# Patient Record
Sex: MALE | Race: OTHER
[De-identification: names, ages, dates, MRNs, and addresses within clinical notes are randomized per-mention and may not be internally consistent; named-entity substitution may affect disease eponyms.]

---

## 2020-06-20 ENCOUNTER — HOSPITAL ENCOUNTER (EMERGENCY)
Dept: HOSPITAL 88 - FSED | Age: 50
Discharge: HOME | End: 2020-06-20
Payer: COMMERCIAL

## 2020-06-20 VITALS — HEIGHT: 65 IN | WEIGHT: 160.38 LBS | BODY MASS INDEX: 26.72 KG/M2

## 2020-06-20 VITALS — DIASTOLIC BLOOD PRESSURE: 91 MMHG | SYSTOLIC BLOOD PRESSURE: 155 MMHG

## 2020-06-20 DIAGNOSIS — M54.42: Primary | ICD-10-CM

## 2020-06-20 PROCEDURE — 99283 EMERGENCY DEPT VISIT LOW MDM: CPT

## 2020-06-20 PROCEDURE — 72100 X-RAY EXAM L-S SPINE 2/3 VWS: CPT

## 2020-06-20 NOTE — DIAGNOSTIC IMAGING REPORT
EXAM: Lumbar spine 3 views

INDICATION:     

Pain

COMPARISON: None



FINDINGS:



There are the usual 5 nonrib bearing lumbar vertebral bodies. Normal lumbar

lordosis. No listhesis. No vertebral body height loss. No scoliosis.



Minimal intervertebral disc height loss at L1-L2 and L2-L3. Mild L4-L5 and

L5-S1 facet arthrosis.



Large amount of bowel gas. No concerning soft tissue abnormality.



IMPRESSION:



No acute radiographic abnormality of the lumbar spine.



Signed by: Rob Porras MD on 6/20/2020 6:58 PM

## 2020-06-20 NOTE — EMERGENCY DEPARTMENT NOTE
History of Present Illnes


History of Present Illness


History of Present Illness


This is a 50 year old Other male  .


Historian:  Patient


Arrival Mode:  Car


 Required:  No


Onset (how long ago):  week(s) (3)


Onset quality:  gradual


Timing of current episode:  intermittent


Progression:  waxing and waning


Chronicity:  new


Context:  Denies recent illness, Denies recent surgery, Denies recent immob

ilization, Denies recent travel, Denies trauma/injury, Denies new medications, 

Denies hx of DVT/PE, Denies non-compliance w/ medications, Denies other


Relieving factors:  none


Exacerbating factors:  other (sitting)


Associated symptoms:  Denies denies other symptoms, Denies confusion, Denies 

chest pain, Denies cough, Denies diaphoresis, Denies fever/chills, Denies 

headaches, Denies loss of appetite, Denies malaise, Denies nausea/vomiting, 

Denies rash, Denies seizure, Denies shortness of breath, Denies syncope, Denies 

weakness, Denies other





Past Medical/Family History


Physician Review


I have reviewed the patient's past medical and family history.  Any updates have

been documented here.





Past Medical History


Recent Fever:  No


Clinical Suspicion of Infectio:  No


New/Unexplained Change in Ment:  No


Past Medical History:  None


Past Surgical History:  None





Social History


Smoking Cessation:  Never Smoker


Alcohol Use:  None


Any Illegal Drug Use:  No





Review of Systems


Review of Systems


Constitutional:  Denies fever


Musculoskeletal:  Reports as per HPI, Reports back pain


Neurological:  Denies weakness


Review of other systems:  All other systems negative





Physical Exam


Related Data


Allergies:  


Coded Allergies:  


     No Known Drug Allergies (Verified  Allergy, Unknown, 6/20/20)


Vital signs reviewed:  Yes





Physical Exam


CONSTITUTIONAL





Constitutional:  Present well-developed, Present well-nourished


HENT


HENT:  Present normocephalic


EYES





Eyes:  Reports conjunctivae normal


NECK


Neck:  Present supple


PULMONARY


Pulmonary:  Present breath sounds normal


CARDIOVASCULAR





Cardiovascular:  Present regular rhythm


GASTROINTESTINAL





Abdominal:  Present soft


GENITOURINARY





SKIN


Skin:  Present warm


MUSCULOSKELETAL





Musculoskeletal:  Present ROM normal


NEUROLOGICAL





Neurological:  Present oriented x 3, Present DTRs normal, Present no gross motor

or sensory deficits; 


   Absent sensory deficit, Absent abnormal gait, Absent weakness


PSYCHOLOGICAL


Psychological:  Present mood/affect normal





Results


Imaging


Imaging results reviewed:  Yes


Impressions





Procedure: [id number] HOPD/L SPINE 2-3 VEWS - HOPD


Exam Date: 06/20/20                            Exam Time: 1855








                              REPORT STATUS: Signed





EXAM: Lumbar spine 3 views


INDICATION:     


Pain


COMPARISON: None





FINDINGS:





There are the usual 5 nonrib bearing lumbar vertebral bodies. Normal lumbar


lordosis. No listhesis. No vertebral body height loss. No scoliosis.





Minimal intervertebral disc height loss at L1-L2 and L2-L3. Mild L4-L5 and


L5-S1 facet arthrosis.





Large amount of bowel gas. No concerning soft tissue abnormality.





IMPRESSION:





No acute radiographic abnormality of the lumbar spine.





Signed by: Hola Frances MD on 6/20/2020 6:58 PM








Dictated By: HOLA FRANCES MD


Electronically Signed By: HOLA FRANCES MD on 06/20/20 1858


Transcribed By: SAMUEL on 06/20/20 1858 








COPY TO:   LEFTY TOBIN MD~





Clinical Decision Tools


HEART Score


HEART Score:  








HEART Score Response (Comments) Value


 


History Slightly suspicious 0


 


EKG Non specific repolarization 1


 


Age                                     45 - 65 1


 


Risk factors                            1 or 2 risk factors 1


 


Troponin                                1-3x normal limit 


 


Total  3











Assessment & Plan


Medical Decision Making


MDM


sciatica, radiculopathy, MSK, DDD, spondylolysis, spondylothisthesis





Assessment & Plan


Final Impression:  


(1) Sciatic leg pain


Depart Disposition:  HOME, SELF-CARE


Home Meds


Active Scripts


Cyclobenzaprine Hcl (FLEXERIL) 5 Mg Tablet, 10 MG PO TID for 5 Days, #15


   Prov:LEFTY TOBIN MD         6/20/20


Naproxen (NAPROSYN) 500 Mg Tablet, 500 MG PO BID for 7 Days, #15 TAB


   Prov:LEFTY TOBIN MD         6/20/20


Prednisone (PREDNISONE) 20 Mg Tab, 20 MG PO TID for 5 Days, #15


   Prov:LEFTY TOBIN MD         6/20/20











LEFTY TOBIN MD           Jun 20, 2020 18:18